# Patient Record
Sex: MALE | Race: WHITE | NOT HISPANIC OR LATINO | Employment: FULL TIME | URBAN - NONMETROPOLITAN AREA
[De-identification: names, ages, dates, MRNs, and addresses within clinical notes are randomized per-mention and may not be internally consistent; named-entity substitution may affect disease eponyms.]

---

## 2023-04-11 ENCOUNTER — TELEMEDICINE (OUTPATIENT)
Dept: PRIMARY CARE | Facility: CLINIC | Age: 63
End: 2023-04-11
Payer: COMMERCIAL

## 2023-04-11 DIAGNOSIS — M54.9 BACK PAIN, UNSPECIFIED BACK LOCATION, UNSPECIFIED BACK PAIN LATERALITY, UNSPECIFIED CHRONICITY: ICD-10-CM

## 2023-04-11 DIAGNOSIS — D64.9 ANEMIA, UNSPECIFIED TYPE: ICD-10-CM

## 2023-04-11 DIAGNOSIS — F41.9 ANXIETY: Chronic | ICD-10-CM

## 2023-04-11 DIAGNOSIS — I10 PRIMARY HYPERTENSION: Primary | ICD-10-CM

## 2023-04-11 PROCEDURE — 99214 OFFICE O/P EST MOD 30 MIN: CPT | Performed by: FAMILY MEDICINE

## 2023-04-11 RX ORDER — VALACYCLOVIR HYDROCHLORIDE 1 G/1
1000 TABLET, FILM COATED ORAL DAILY
COMMUNITY
Start: 2016-05-13 | End: 2023-09-11

## 2023-04-11 RX ORDER — LISINOPRIL 40 MG/1
40 TABLET ORAL DAILY
COMMUNITY
Start: 2020-01-07 | End: 2023-10-25

## 2023-04-11 RX ORDER — CHLORTHALIDONE 25 MG/1
25 TABLET ORAL DAILY
COMMUNITY
Start: 2022-05-16 | End: 2023-06-08

## 2023-04-11 RX ORDER — TADALAFIL 5 MG/1
5 TABLET ORAL DAILY PRN
COMMUNITY
Start: 2014-08-15 | End: 2023-09-22 | Stop reason: SDUPTHER

## 2023-04-11 RX ORDER — CARVEDILOL PHOSPHATE 40 MG/1
40 CAPSULE, EXTENDED RELEASE ORAL DAILY
COMMUNITY
Start: 2022-01-17 | End: 2023-06-08

## 2023-04-11 RX ORDER — GABAPENTIN 100 MG/1
1 CAPSULE ORAL 3 TIMES DAILY
COMMUNITY
Start: 2022-06-14 | End: 2023-09-22 | Stop reason: ALTCHOICE

## 2023-04-11 ASSESSMENT — PATIENT HEALTH QUESTIONNAIRE - PHQ9
1. LITTLE INTEREST OR PLEASURE IN DOING THINGS: NOT AT ALL
SUM OF ALL RESPONSES TO PHQ9 QUESTIONS 1 AND 2: 0
2. FEELING DOWN, DEPRESSED OR HOPELESS: NOT AT ALL

## 2023-04-11 ASSESSMENT — ANXIETY QUESTIONNAIRES
6. BECOMING EASILY ANNOYED OR IRRITABLE: NOT AT ALL
4. TROUBLE RELAXING: NOT AT ALL
5. BEING SO RESTLESS THAT IT IS HARD TO SIT STILL: NOT AT ALL
2. NOT BEING ABLE TO STOP OR CONTROL WORRYING: NOT AT ALL
3. WORRYING TOO MUCH ABOUT DIFFERENT THINGS: NOT AT ALL
7. FEELING AFRAID AS IF SOMETHING AWFUL MIGHT HAPPEN: NOT AT ALL
1. FEELING NERVOUS, ANXIOUS, OR ON EDGE: NOT AT ALL
GAD7 TOTAL SCORE: 0

## 2023-04-11 NOTE — PROGRESS NOTES
Subjective   Patient ID: Kashif Chapin is a 62 y.o. male who presents for virtual Follow-up (6 month follow up anxiety).    HPI     Patient reports that his moods are good.     Patient reports that he is sleeping well.     Patient states that he is walking daily.     Patient denies any chest pain, chest tightness, or chest pressure. States he checks his blood pressure at home daily.     Patient reports that his back pain has improved. States he takes Advil and Tylenol PRN.     Reviewed and discussed medication list. Patient denies any medication side effects.      Discussed and reviewed labwork done on 2/7/2023.    Review of Systems   All other systems reviewed and are negative.      Objective   There were no vitals taken for this visit.    Physical Exam  No exam done due to virtual visit.      Assessment/Plan     1. Primary hypertension    2. Anxiety    3. Back pain, unspecified back location, unspecified back pain laterality, unspecified chronicity    4. Anemia, unspecified type         I, Dr. Massimo Cowan, attest that this note for 4/11/2023 accurately reflects documentation that I made (or my scribe made at my direction) in my capacity as Dr. Massimo Cowan when I treated Kashif Chapin.

## 2023-04-16 PROBLEM — D64.9 ANEMIA: Status: ACTIVE | Noted: 2023-04-16

## 2023-04-16 PROBLEM — I10 PRIMARY HYPERTENSION: Status: ACTIVE | Noted: 2023-04-16

## 2023-05-04 DIAGNOSIS — F41.8 OTHER SPECIFIED ANXIETY DISORDERS: ICD-10-CM

## 2023-05-05 RX ORDER — ALPRAZOLAM 0.5 MG/1
TABLET ORAL
Qty: 60 TABLET | Refills: 0 | Status: SHIPPED | OUTPATIENT
Start: 2023-05-05 | End: 2023-06-12

## 2023-06-08 DIAGNOSIS — I10 PRIMARY HYPERTENSION: ICD-10-CM

## 2023-06-08 RX ORDER — CARVEDILOL PHOSPHATE 40 MG/1
CAPSULE, EXTENDED RELEASE ORAL
Qty: 90 CAPSULE | Refills: 3 | Status: SHIPPED | OUTPATIENT
Start: 2023-06-08

## 2023-06-08 RX ORDER — CHLORTHALIDONE 25 MG/1
TABLET ORAL
Qty: 90 TABLET | Refills: 3 | Status: SHIPPED | OUTPATIENT
Start: 2023-06-08 | End: 2023-09-22

## 2023-06-09 DIAGNOSIS — F41.8 OTHER SPECIFIED ANXIETY DISORDERS: ICD-10-CM

## 2023-06-12 RX ORDER — ALPRAZOLAM 0.5 MG/1
TABLET ORAL
Qty: 60 TABLET | Refills: 0 | Status: SHIPPED | OUTPATIENT
Start: 2023-06-12 | End: 2023-07-13

## 2023-06-13 ENCOUNTER — TELEPHONE (OUTPATIENT)
Dept: PRIMARY CARE | Facility: CLINIC | Age: 63
End: 2023-06-13
Payer: COMMERCIAL

## 2023-06-13 DIAGNOSIS — E87.1 HYPONATREMIA: Primary | ICD-10-CM

## 2023-06-13 NOTE — TELEPHONE ENCOUNTER
Call the patient and tell him that his recent labs show his Sodium level is dangerously low at 120,  normal should be around 135.  He must go to the Emergency Dept, to be evaluated and treated,Immediately.  This is very dangerous, it can cause him to have a seizure.  Please send the results of this blood test to the Emergency Dept that he is going to

## 2023-06-13 NOTE — TELEPHONE ENCOUNTER
I spoke with the patient and he is aware of the results, and he is aware that he needs to go to the ER,  He indicated he would go to the ER

## 2023-06-13 NOTE — TELEPHONE ENCOUNTER
Ro SHARMA from [facility] lab called with critical lab results. Pt's sodium level was 120. If you have any questions/concerns for Ro she can be reached at [phone].

## 2023-06-13 NOTE — TELEPHONE ENCOUNTER
Patient is aware, put him on hold to speak with Dr. Cowan directly since he was hesitant to go to emergency room at this moment.

## 2023-06-16 PROBLEM — B00.9 HERPES SIMPLEX: Status: ACTIVE | Noted: 2023-06-16

## 2023-06-16 PROBLEM — G47.00 INSOMNIA: Status: ACTIVE | Noted: 2023-06-16

## 2023-06-16 PROBLEM — B00.1 COLD SORE: Status: ACTIVE | Noted: 2023-06-16

## 2023-06-16 PROBLEM — N40.1 BPH ASSOCIATED WITH NOCTURIA: Status: ACTIVE | Noted: 2023-06-16

## 2023-06-16 PROBLEM — E29.1 TESTICULAR HYPOFUNCTION: Status: ACTIVE | Noted: 2023-06-16

## 2023-06-16 PROBLEM — N50.89 SWELLING OF SCROTUM PRESENT ON EXAMINATION: Status: ACTIVE | Noted: 2023-06-16

## 2023-06-16 PROBLEM — R35.0 URINARY FREQUENCY: Status: ACTIVE | Noted: 2023-06-16

## 2023-06-16 PROBLEM — L30.9 DERMATITIS: Status: ACTIVE | Noted: 2023-06-16

## 2023-06-16 PROBLEM — R35.1 BPH ASSOCIATED WITH NOCTURIA: Status: ACTIVE | Noted: 2023-06-16

## 2023-06-16 PROBLEM — J30.9 ALLERGIC RHINITIS: Status: ACTIVE | Noted: 2023-06-16

## 2023-06-16 PROBLEM — R73.01 ELEVATED FASTING GLUCOSE: Status: ACTIVE | Noted: 2023-06-16

## 2023-06-16 PROBLEM — M54.16 LUMBAR RADICULOPATHY: Status: ACTIVE | Noted: 2023-06-16

## 2023-06-16 PROBLEM — L01.00 IMPETIGO: Status: ACTIVE | Noted: 2023-06-16

## 2023-06-16 PROBLEM — E34.9 TESTOSTERONE DEFICIENCY: Status: ACTIVE | Noted: 2023-06-16

## 2023-06-16 PROBLEM — H61.22 IMPACTED CERUMEN OF LEFT EAR: Status: ACTIVE | Noted: 2023-06-16

## 2023-06-16 PROBLEM — E78.5 HYPERLIPIDEMIA: Status: ACTIVE | Noted: 2023-06-16

## 2023-06-16 PROBLEM — N50.82 PAIN IN SCROTUM WITHOUT TRAUMA OF SCROTUM: Status: ACTIVE | Noted: 2023-06-16

## 2023-06-16 PROBLEM — N50.819 PERSISTENT TESTICULAR PAIN: Status: ACTIVE | Noted: 2023-06-16

## 2023-06-19 ENCOUNTER — LAB (OUTPATIENT)
Dept: LAB | Facility: LAB | Age: 63
End: 2023-06-19
Payer: COMMERCIAL

## 2023-06-19 ENCOUNTER — OFFICE VISIT (OUTPATIENT)
Dept: PRIMARY CARE | Facility: CLINIC | Age: 63
End: 2023-06-19
Payer: COMMERCIAL

## 2023-06-19 ENCOUNTER — TELEPHONE (OUTPATIENT)
Dept: PRIMARY CARE | Facility: CLINIC | Age: 63
End: 2023-06-19

## 2023-06-19 VITALS
BODY MASS INDEX: 29.46 KG/M2 | DIASTOLIC BLOOD PRESSURE: 79 MMHG | HEART RATE: 76 BPM | WEIGHT: 202.4 LBS | SYSTOLIC BLOOD PRESSURE: 147 MMHG | OXYGEN SATURATION: 97 %

## 2023-06-19 DIAGNOSIS — F41.9 ANXIETY: Chronic | ICD-10-CM

## 2023-06-19 DIAGNOSIS — I10 PRIMARY HYPERTENSION: ICD-10-CM

## 2023-06-19 DIAGNOSIS — E87.1 HYPONATREMIA: ICD-10-CM

## 2023-06-19 DIAGNOSIS — Z79.899 MEDICATION MANAGEMENT: ICD-10-CM

## 2023-06-19 DIAGNOSIS — E87.1 HYPONATREMIA: Primary | ICD-10-CM

## 2023-06-19 DIAGNOSIS — M54.16 LUMBAR RADICULOPATHY: ICD-10-CM

## 2023-06-19 DIAGNOSIS — R35.1 BPH ASSOCIATED WITH NOCTURIA: ICD-10-CM

## 2023-06-19 DIAGNOSIS — N40.1 BPH ASSOCIATED WITH NOCTURIA: ICD-10-CM

## 2023-06-19 LAB
ANION GAP IN SER/PLAS: 13 MMOL/L (ref 10–20)
CALCIUM (MG/DL) IN SER/PLAS: 10.2 MG/DL (ref 8.6–10.6)
CARBON DIOXIDE, TOTAL (MMOL/L) IN SER/PLAS: 32 MMOL/L (ref 21–32)
CHLORIDE (MMOL/L) IN SER/PLAS: 87 MMOL/L (ref 98–107)
CREATININE (MG/DL) IN SER/PLAS: 0.95 MG/DL (ref 0.5–1.3)
GFR MALE: >90 ML/MIN/1.73M2
GLUCOSE (MG/DL) IN SER/PLAS: 118 MG/DL (ref 74–99)
POTASSIUM (MMOL/L) IN SER/PLAS: 3.5 MMOL/L (ref 3.5–5.3)
SODIUM (MMOL/L) IN SER/PLAS: 128 MMOL/L (ref 136–145)
UREA NITROGEN (MG/DL) IN SER/PLAS: 10 MG/DL (ref 6–23)

## 2023-06-19 PROCEDURE — 80048 BASIC METABOLIC PNL TOTAL CA: CPT

## 2023-06-19 PROCEDURE — 80365 DRUG SCREENING OXYCODONE: CPT

## 2023-06-19 PROCEDURE — 36415 COLL VENOUS BLD VENIPUNCTURE: CPT

## 2023-06-19 PROCEDURE — 3078F DIAST BP <80 MM HG: CPT | Performed by: FAMILY MEDICINE

## 2023-06-19 PROCEDURE — 80361 OPIATES 1 OR MORE: CPT

## 2023-06-19 PROCEDURE — 80368 SEDATIVE HYPNOTICS: CPT

## 2023-06-19 PROCEDURE — 80346 BENZODIAZEPINES1-12: CPT

## 2023-06-19 PROCEDURE — 80373 DRUG SCREENING TRAMADOL: CPT

## 2023-06-19 PROCEDURE — 99214 OFFICE O/P EST MOD 30 MIN: CPT | Performed by: FAMILY MEDICINE

## 2023-06-19 PROCEDURE — 80358 DRUG SCREENING METHADONE: CPT

## 2023-06-19 PROCEDURE — 80354 DRUG SCREENING FENTANYL: CPT

## 2023-06-19 PROCEDURE — 3077F SYST BP >= 140 MM HG: CPT | Performed by: FAMILY MEDICINE

## 2023-06-19 PROCEDURE — 80307 DRUG TEST PRSMV CHEM ANLYZR: CPT

## 2023-06-19 ASSESSMENT — ENCOUNTER SYMPTOMS
GASTROINTESTINAL NEGATIVE: 1
ENDOCRINE NEGATIVE: 1
NEUROLOGICAL NEGATIVE: 1
CARDIOVASCULAR NEGATIVE: 1
CONSTITUTIONAL NEGATIVE: 1
MUSCULOSKELETAL NEGATIVE: 1
ALLERGIC/IMMUNOLOGIC NEGATIVE: 1
PSYCHIATRIC NEGATIVE: 1
RESPIRATORY NEGATIVE: 1
HEMATOLOGIC/LYMPHATIC NEGATIVE: 1
EYES NEGATIVE: 1

## 2023-06-19 NOTE — PATIENT INSTRUCTIONS
1.  Hyponatremia    Last week your sodium was 120.  You appropriately went to the emergency department in [place].  Unfortunately we do not have the labs that were obtained at that time.  We will place request for those labs we will contact you with those results.    Please repeat labs today we will contact you with those results    Continue on all your current medications as well as your supplements.  Depending on the results of the sodium we may need to discontinue the chlorthalidone but I am concerned your blood pressure will go up therefore we will have to switch to a different medication    2.  Hypertension    Continue on your [place] Radha all your lisinopril and chlorthalidone for now by lowering blood pressure we do reduce risk for heart attack and stroke    3.  Continue on the gabapentin as needed for your lumbar radiculopathy and leg pain    4.  Continue on low-dose Xanax as needed for periods of heightened anxiety.  Because of his medicines a controlled substance we will do a urine test today and update your controlled substance agreement    I will call you with results of labs you will call me if anything changes we can do a virtual visit in 3 months but am happy to see you sooner if needed

## 2023-06-19 NOTE — PROGRESS NOTES
Subjective   Patient ID: Kashif Chapin is a 63 y.o. male who presents for Hypertension, Anxiety (Due for UDS (last 1/17/2022) /CSA signed in Epic today /Scales given ), and Hospital Follow-up (Went to ER d/t low sodium, was given IV fluids. Signed out AMA //Would like to know what they put into his IV at the hospital and desiree it as a drug allergy because it caused his bp to spike really high ).    Patient notes he waited for 5 hours in the ER before refusing medical care due to needing medication in a timely manner.  He would like to obtain the lab results done at the hospital since he was not informed of the results. He then returned home and consumed electrolyte fluids. Patient has stopped salting his food and switched to an all meat diet. He walks a mile daily. Patient is compliant with medication and reports benefits. He denies side effects. Patient denies chest pain, pressure, and tightness upon exertion. He notes when his sodium is low he feels tired and dehydrated.    Review of Systems   Constitutional: Negative.    HENT: Negative.     Eyes: Negative.    Respiratory: Negative.     Cardiovascular: Negative.    Gastrointestinal: Negative.    Endocrine: Negative.    Genitourinary: Negative.    Musculoskeletal: Negative.    Skin: Negative.    Allergic/Immunologic: Negative.    Neurological: Negative.    Hematological: Negative.    Psychiatric/Behavioral: Negative.         Objective   /79   Pulse 76   Wt 91.8 kg (202 lb 6.4 oz)   SpO2 97%   BMI 29.46 kg/m²     Physical Exam  Constitutional:       Appearance: Normal appearance.   HENT:      Head: Normocephalic and atraumatic.      Right Ear: Tympanic membrane normal.      Left Ear: Tympanic membrane normal.      Nose: Nose normal.      Mouth/Throat:      Mouth: Mucous membranes are moist.      Pharynx: Oropharynx is clear.   Eyes:      Extraocular Movements: Extraocular movements intact.      Conjunctiva/sclera: Conjunctivae normal.      Pupils: Pupils  are equal, round, and reactive to light.   Cardiovascular:      Rate and Rhythm: Normal rate and regular rhythm.      Pulses: Normal pulses.      Heart sounds: Normal heart sounds.   Pulmonary:      Effort: Pulmonary effort is normal.      Breath sounds: Normal breath sounds.   Abdominal:      General: Bowel sounds are normal.      Palpations: Abdomen is soft.   Musculoskeletal:         General: Normal range of motion.      Cervical back: Normal range of motion and neck supple.   Skin:     General: Skin is warm.   Neurological:      Mental Status: He is alert and oriented to person, place, and time.   Psychiatric:         Mood and Affect: Mood normal.         Behavior: Behavior normal.         Assessment/Plan   Diagnoses and all orders for this visit:  Hyponatremia  -     Basic metabolic panel; Future  Medication management  -     Opiate/Opioid/Benzo Extended Prescription Compliance; Future  Anxiety  Comments:  Continue on current doses of low-dose Xanax.  .  Try to be outside every day.  Continue with daily meditation.  Call if worsens  Orders:  -     Follow Up In [facility] - PCP  Primary hypertension  Comments:  Continue to check blood pressure at home goal for systolic less than 140 and continue on all current medications as prescribed for blood pressure  Orders:  -     Follow Up In [facility] - PCP    1.  Hyponatremia    Last week your sodium was 120.  You appropriately went to the emergency department in [place].  Unfortunately we do not have the labs that were obtained at that time.  We will place request for those labs we will contact you with those results.    Please repeat labs today we will contact you with those results    Continue on all your current medications as well as your supplements.  Depending on the results of the sodium we may need to discontinue the chlorthalidone but I am concerned your blood pressure will go up therefore we will have to switch to a different  medication    2.  Hypertension    Continue on your [place] Radha all your lisinopril and chlorthalidone for now by lowering blood pressure we do reduce risk for heart attack and stroke    3.  Continue on the gabapentin as needed for your lumbar radiculopathy and leg pain    4.  Continue on low-dose Xanax as needed for periods of heightened anxiety.  Because of his medicines a controlled substance we will do a urine test today and update your controlled substance agreement    I will call you with results of labs you will call me if anything changes we can do a virtual visit in 3 months but am happy to see you sooner if needed      Scribe Attestation  By signing my name below, IDeepika , Scribe   attest that this documentation has been prepared under the direction and in the presence of Massimo Cowan MD.

## 2023-06-19 NOTE — TELEPHONE ENCOUNTER
Records request for patient's ER visit at [facility] in [place]   I called and left a message for the records, asked to cb or have them faxed

## 2023-06-20 ENCOUNTER — TELEPHONE (OUTPATIENT)
Dept: PRIMARY CARE | Facility: CLINIC | Age: 63
End: 2023-06-20

## 2023-06-20 NOTE — TELEPHONE ENCOUNTER
Patient called and states that insurance presented yesterday does date back to April 2023  Dee CHRISTIAN notified.

## 2023-06-20 NOTE — TELEPHONE ENCOUNTER
Left message for patient to return call back.  The insurance we have for his appt. 4/11 is inactive.  There are 3 [place] plans in his chart and they are all inactive.  Does he have a new insurance?  The is a [employer] card scanned, is that his new insurance?

## 2023-06-22 LAB
6-ACETYLMORPHINE: <25 NG/ML
7-AMINOCLONAZEPAM: <25 NG/ML
ALPHA-HYDROXYALPRAZOLAM: <25 NG/ML
ALPHA-HYDROXYMIDAZOLAM: <25 NG/ML
ALPRAZOLAM: 37 NG/ML
AMPHETAMINE (PRESENCE) IN URINE BY SCREEN METHOD: ABNORMAL
BARBITURATES PRESENCE IN URINE BY SCREEN METHOD: ABNORMAL
CANNABINOIDS IN URINE BY SCREEN METHOD: ABNORMAL
CHLORDIAZEPOXIDE: <25 NG/ML
CLONAZEPAM: <25 NG/ML
COCAINE (PRESENCE) IN URINE BY SCREEN METHOD: ABNORMAL
CODEINE: <50 NG/ML
CREATINE, URINE FOR DRUG: 20.3 MG/DL
DIAZEPAM: <25 NG/ML
DRUG SCREEN COMMENT URINE: ABNORMAL
EDDP: <25 NG/ML
FENTANYL CONFIRMATION, URINE: <2.5 NG/ML
HYDROCODONE: <25 NG/ML
HYDROMORPHONE: <25 NG/ML
LORAZEPAM: <25 NG/ML
METHADONE CONFIRMATION,URINE: <25 NG/ML
MIDAZOLAM: <25 NG/ML
MORPHINE URINE: <50 NG/ML
NORDIAZEPAM: <25 NG/ML
NORFENTANYL: <2.5 NG/ML
NORHYDROCODONE: <25 NG/ML
NOROXYCODONE: <25 NG/ML
O-DESMETHYLTRAMADOL: <50 NG/ML
OXAZEPAM: <25 NG/ML
OXYCODONE: <25 NG/ML
OXYMORPHONE: <25 NG/ML
PHENCYCLIDINE (PRESENCE) IN URINE BY SCREEN METHOD: ABNORMAL
TEMAZEPAM: <25 NG/ML
TRAMADOL: <50 NG/ML
ZOLPIDEM METABOLITE (ZCA): <25 NG/ML
ZOLPIDEM: <25 NG/ML

## 2023-06-23 PROBLEM — E87.1 HYPONATREMIA: Status: ACTIVE | Noted: 2023-06-23

## 2023-07-13 DIAGNOSIS — F41.8 OTHER SPECIFIED ANXIETY DISORDERS: ICD-10-CM

## 2023-07-13 RX ORDER — ALPRAZOLAM 0.5 MG/1
TABLET ORAL
Qty: 60 TABLET | Refills: 0 | Status: SHIPPED | OUTPATIENT
Start: 2023-07-13 | End: 2023-08-17

## 2023-07-21 ENCOUNTER — APPOINTMENT (OUTPATIENT)
Dept: PRIMARY CARE | Facility: CLINIC | Age: 63
End: 2023-07-21
Payer: COMMERCIAL

## 2023-08-17 DIAGNOSIS — F41.8 OTHER SPECIFIED ANXIETY DISORDERS: ICD-10-CM

## 2023-08-17 RX ORDER — ALPRAZOLAM 0.5 MG/1
TABLET ORAL
Qty: 60 TABLET | Refills: 0 | Status: SHIPPED | OUTPATIENT
Start: 2023-08-17 | End: 2023-09-19

## 2023-09-11 DIAGNOSIS — B00.9 HERPES SIMPLEX: Primary | ICD-10-CM

## 2023-09-11 RX ORDER — VALACYCLOVIR HYDROCHLORIDE 1 G/1
1000 TABLET, FILM COATED ORAL DAILY
Qty: 30 TABLET | Refills: 11 | Status: SHIPPED | OUTPATIENT
Start: 2023-09-11

## 2023-09-17 NOTE — PROGRESS NOTES
Subjective   Kashif Chapin is a 63 y.o. male who presents for Follow-up (Meds, did blood test today, interested in siadh and would like to discuss).    The patient is compliant with medications. Patient denies any side effects to the medications. The patient Is clinically stable so we will continue with current medications and lab work to confirm status ordered.       HPI    Anxiety:  The patient is presenting today for a follow up of anxiety disorder. He denies having any current suicidal and/or homicidal ideation.  pt reports that he is taking one xanax a day.    Low sodium: someone in family suggested that he be tested for SIADH. Pt is still intaking a decent amount of sodium. The patient is continuously working on diet and exercise. The patient will continue working on strategies to maintain weight loss and stay well hydrated.       Review of Systems   Constitutional:  Negative for chills, diaphoresis and fever.   HENT:  Negative for congestion, ear pain, hearing loss, sinus pressure and sinus pain.    Eyes:  Negative for pain and visual disturbance.   Respiratory:  Negative for chest tightness and shortness of breath.    Cardiovascular:  Negative for chest pain and leg swelling.   Gastrointestinal:  Negative for abdominal pain, blood in stool, constipation, diarrhea, nausea and vomiting.   Genitourinary:  Negative for dysuria, frequency and urgency.   Musculoskeletal:  Negative for arthralgias, myalgias and neck pain.   Neurological:  Negative for headaches.     OARRS:  Massimo Cowan MD on 9/22/2023  3:23 PM  I have personally reviewed the OARRS report for Kasihf Chapin. I have considered the risks of abuse, dependence, addiction and diversion    Is the patient prescribed a combination of a benzodiazepine and opioid?  Yes, I feel it is clincially indicated to continue the medication and have discussed with the patient risks/benefits/alternatives.    Last Urine Drug Screen / ordered today: No  Recent Results  (from the past 8760 hour(s))   OPIATE/OPIOID/BENZO PRESCRIPTION COMPLIANCE    Collection Time: 06/19/23 12:24 PM   Result Value Ref Range    DRUG SCREEN COMMENT URINE SEE BELOW     Creatine, Urine 20.3 mg/dL    Amphetamine Screen, Urine PRESUMPTIVE NEGATIVE NEGATIVE    Barbiturate Screen, Urine PRESUMPTIVE NEGATIVE NEGATIVE    Cannabinoid Screen, Urine PRESUMPTIVE NEGATIVE NEGATIVE    Cocaine Screen, Urine PRESUMPTIVE NEGATIVE NEGATIVE    PCP Screen, Urine PRESUMPTIVE NEGATIVE NEGATIVE    7-Aminoclonazepam <25 Cutoff <25 ng/mL    Alpha-Hydroxyalprazolam <25 Cutoff <25 ng/mL    Alpha-Hydroxymidazolam <25 Cutoff <25 ng/mL    Alprazolam 37 (A) Cutoff <25 ng/mL    Chlordiazepoxide <25 Cutoff <25 ng/mL    Clonazepam <25 Cutoff <25 ng/mL    Diazepam <25 Cutoff <25 ng/mL    Lorazepam <25 Cutoff <25 ng/mL    Midazolam <25 Cutoff <25 ng/mL    Nordiazepam <25 Cutoff <25 ng/mL    Oxazepam <25 Cutoff <25 ng/mL    Temazepam <25 Cutoff <25 ng/mL    Zolpidem <25 Cutoff <25 ng/mL    Zolpidem Metabolite (ZCA) <25 Cutoff <25 ng/mL    6-Acetylmorphine <25 Cutoff <25 ng/mL    Codeine <50 Cutoff <50 ng/mL    Hydrocodone <25 Cutoff <25 ng/mL    Hydromorphone <25 Cutoff <25 ng/mL    Morphine Urine <50 Cutoff <50 ng/mL    Norhydrocodone <25 Cutoff <25 ng/mL    Noroxycodone <25 Cutoff <25 ng/mL    Oxycodone <25 Cutoff <25 ng/mL    Oxymorphone <25 Cutoff <25 ng/mL    Tramadol <50 Cutoff <50 ng/mL    O-Desmethyltramadol <50 Cutoff <50 ng/mL    Fentanyl <2.5 Cutoff<2.5 ng/mL    Norfentanyl <2.5 Cutoff<2.5 ng/mL    METHADONE CONFIRMATION,URINE <25 Cutoff <25 ng/mL    EDDP <25 Cutoff <25 ng/mL     Results are as expected.         Controlled Substance Agreement:  Date of the Last Agreement: UDS& CDS 6/19/2023  Reviewed Controlled Substance Agreement including but not limited to the benefits, risks, and alternatives to treatment with a Controlled Substance medication(s).      Objective   This is a virtual visit with real-time audio and video  communication with the patient. The patient is in no acute distress.    Labs active- future.       Assessment/Plan   Problem List Items Addressed This Visit       Anxiety    Primary hypertension    Nocturia associated with benign prostatic hyperplasia - Primary    Relevant Medications    tadalafil (Cialis) 5 mg tablet    Hyperlipidemia    Hyponatremia     A telephone visit (audio and video) between the patient (at the originating site) and the provider (at the distant site) was utilized to provide this telehealth service.   Verbal consent was requested and obtained from Kashif Chapin on this date, 09/22/23    , for a telehealth visit.      Follow up in: 3 month(s) or sooner if needed with labs prior.      Scribe Attestation  By signing my name below, I, Ila Michael   attest that this documentation has been prepared under the direction and in the presence of Massimo Cowan MD.

## 2023-09-19 DIAGNOSIS — F41.8 OTHER SPECIFIED ANXIETY DISORDERS: ICD-10-CM

## 2023-09-19 RX ORDER — ALPRAZOLAM 0.5 MG/1
0.5 TABLET ORAL 2 TIMES DAILY PRN
Qty: 60 TABLET | Refills: 0 | Status: SHIPPED | OUTPATIENT
Start: 2023-09-19 | End: 2023-10-19

## 2023-09-22 ENCOUNTER — TELEMEDICINE (OUTPATIENT)
Dept: PRIMARY CARE | Facility: CLINIC | Age: 63
End: 2023-09-22
Payer: COMMERCIAL

## 2023-09-22 DIAGNOSIS — I10 PRIMARY HYPERTENSION: ICD-10-CM

## 2023-09-22 DIAGNOSIS — F41.9 ANXIETY: Primary | Chronic | ICD-10-CM

## 2023-09-22 DIAGNOSIS — E87.1 HYPONATREMIA: ICD-10-CM

## 2023-09-22 DIAGNOSIS — R35.1 NOCTURIA ASSOCIATED WITH BENIGN PROSTATIC HYPERPLASIA: ICD-10-CM

## 2023-09-22 DIAGNOSIS — N40.1 NOCTURIA ASSOCIATED WITH BENIGN PROSTATIC HYPERPLASIA: ICD-10-CM

## 2023-09-22 DIAGNOSIS — E78.2 MIXED HYPERLIPIDEMIA: ICD-10-CM

## 2023-09-22 PROBLEM — M54.50 LOW BACK PAIN: Status: ACTIVE | Noted: 2023-04-11

## 2023-09-22 PROCEDURE — 99214 OFFICE O/P EST MOD 30 MIN: CPT | Performed by: FAMILY MEDICINE

## 2023-09-22 RX ORDER — TADALAFIL 5 MG/1
5 TABLET ORAL DAILY
Qty: 90 TABLET | Refills: 3 | Status: SHIPPED | OUTPATIENT
Start: 2023-09-22 | End: 2024-02-06 | Stop reason: SDUPTHER

## 2023-09-22 ASSESSMENT — ANXIETY QUESTIONNAIRES
2. NOT BEING ABLE TO STOP OR CONTROL WORRYING: NOT AT ALL
3. WORRYING TOO MUCH ABOUT DIFFERENT THINGS: NOT AT ALL
1. FEELING NERVOUS, ANXIOUS, OR ON EDGE: NOT AT ALL
6. BECOMING EASILY ANNOYED OR IRRITABLE: NOT AT ALL
7. FEELING AFRAID AS IF SOMETHING AWFUL MIGHT HAPPEN: NOT AT ALL
4. TROUBLE RELAXING: NOT AT ALL
5. BEING SO RESTLESS THAT IT IS HARD TO SIT STILL: NOT AT ALL
GAD7 TOTAL SCORE: 0

## 2023-09-22 ASSESSMENT — ENCOUNTER SYMPTOMS
SINUS PAIN: 0
DYSURIA: 0
SINUS PRESSURE: 0
VOMITING: 0
HEADACHES: 0
DIARRHEA: 0
CHILLS: 0
DIAPHORESIS: 0
BLOOD IN STOOL: 0
MYALGIAS: 0
ARTHRALGIAS: 0
FEVER: 0
CONSTIPATION: 0
FREQUENCY: 0
EYE PAIN: 0
CHEST TIGHTNESS: 0
ABDOMINAL PAIN: 0
SHORTNESS OF BREATH: 0
NAUSEA: 0
NECK PAIN: 0

## 2023-09-22 ASSESSMENT — PATIENT HEALTH QUESTIONNAIRE - PHQ9
SUM OF ALL RESPONSES TO PHQ QUESTIONS 1-9: 0
6. FEELING BAD ABOUT YOURSELF - OR THAT YOU ARE A FAILURE OR HAVE LET YOURSELF OR YOUR FAMILY DOWN: NOT AT ALL
3. TROUBLE FALLING OR STAYING ASLEEP OR SLEEPING TOO MUCH: NOT AT ALL
4. FEELING TIRED OR HAVING LITTLE ENERGY: NOT AT ALL
2. FEELING DOWN, DEPRESSED OR HOPELESS: NOT AT ALL
7. TROUBLE CONCENTRATING ON THINGS, SUCH AS READING THE NEWSPAPER OR WATCHING TELEVISION: NOT AT ALL
1. LITTLE INTEREST OR PLEASURE IN DOING THINGS: NOT AT ALL
8. MOVING OR SPEAKING SO SLOWLY THAT OTHER PEOPLE COULD HAVE NOTICED. OR THE OPPOSITE, BEING SO FIGETY OR RESTLESS THAT YOU HAVE BEEN MOVING AROUND A LOT MORE THAN USUAL: NOT AT ALL
9. THOUGHTS THAT YOU WOULD BE BETTER OFF DEAD, OR OF HURTING YOURSELF: NOT AT ALL
5. POOR APPETITE OR OVEREATING: NOT AT ALL
SUM OF ALL RESPONSES TO PHQ9 QUESTIONS 1 AND 2: 0

## 2023-10-03 ENCOUNTER — TELEPHONE (OUTPATIENT)
Dept: PRIMARY CARE | Facility: CLINIC | Age: 63
End: 2023-10-03

## 2023-10-03 DIAGNOSIS — E87.1 HYPONATREMIA: Primary | ICD-10-CM

## 2023-10-03 NOTE — TELEPHONE ENCOUNTER
The patient is calling in this morning with questions about his sodium levels.  On 9/22 there was an order in our system for a BMP. The patient went to an outside lab, what is scanned into our system shows they sarah a CBC with Diff.  The patient states this does not show his sodium, and I explained that a CBC with Diff would not show a sodium level because that specific lab does not test for Sodium.  The patient is confused because he states that he and the provider had spoke about drinking electrolytes to bring up his levels and wanting it tested.  He is asking if there was an error on the labs part or what happened?  Does he need to have a new order placed to have this tested?

## 2023-10-03 NOTE — TELEPHONE ENCOUNTER
Call and tell him that the Order was placed for a Basic Metabolic Panel, but it appears that the lab ran a CBC?  Not sure why.  I can place a new order for a Basic metabolic panel.  If the basic metabolic panel continues to show a low sodium then I will place the orders for the Urine Sodium and Urine Osm. And the Serum Osm and the Serum Sodium and he will need to have this done to help determine why his sodium continues to be low.    I printed the order for his repeat Basic Metabolic Panel,  we may need to fax it to him or the hospital

## 2023-10-16 ENCOUNTER — TELEPHONE (OUTPATIENT)
Dept: PRIMARY CARE | Facility: CLINIC | Age: 63
End: 2023-10-16
Payer: COMMERCIAL

## 2023-10-16 DIAGNOSIS — M54.16 LUMBAR RADICULOPATHY: Primary | ICD-10-CM

## 2023-10-16 DIAGNOSIS — E87.1 HYPONATREMIA: ICD-10-CM

## 2023-10-16 NOTE — TELEPHONE ENCOUNTER
Pt calling asking if you can please place a referral for him to continue physical therapy for his back? [facility]    [phone] fax [phone]

## 2023-10-19 DIAGNOSIS — F41.8 OTHER SPECIFIED ANXIETY DISORDERS: ICD-10-CM

## 2023-10-19 RX ORDER — ALPRAZOLAM 0.5 MG/1
0.5 TABLET ORAL 2 TIMES DAILY PRN
Qty: 60 TABLET | Refills: 0 | Status: SHIPPED | OUTPATIENT
Start: 2023-10-19 | End: 2023-11-20 | Stop reason: SDUPTHER

## 2023-10-20 ENCOUNTER — TELEPHONE (OUTPATIENT)
Dept: PRIMARY CARE | Facility: CLINIC | Age: 63
End: 2023-10-20
Payer: COMMERCIAL

## 2023-10-20 DIAGNOSIS — M25.519 SHOULDER PAIN, UNSPECIFIED CHRONICITY, UNSPECIFIED LATERALITY: ICD-10-CM

## 2023-10-20 DIAGNOSIS — M54.50 LOW BACK PAIN, UNSPECIFIED BACK PAIN LATERALITY, UNSPECIFIED CHRONICITY, UNSPECIFIED WHETHER SCIATICA PRESENT: Primary | ICD-10-CM

## 2023-10-20 NOTE — TELEPHONE ENCOUNTER
I printed the order for his Physical Therapy.  I believe this was already done, please fax to the therapist of his choice

## 2023-10-20 NOTE — TELEPHONE ENCOUNTER
----- Message from Valeria Cintron CMA sent at 10/19/2023 12:54 PM EDT -----  Regarding: FW: Physical therapy   Contact: [phone]    ----- Message -----  From: Kashif Chapin  Sent: 10/19/2023  12:51 PM EDT  To: Do Mccabe[address] [place] [address] Clinical Support Staff  Subject: Physical therapy                                 Hey Dr Cowan  Please send a referral to [facility] here in [facility] [place]  They have helped a lot with back and shoulder soreness from working out   Fax: [phone]  Phone: [phone]  Dr Martinez is who I see for dry needling  Please advise

## 2023-10-25 DIAGNOSIS — E78.5 HYPERLIPIDEMIA, UNSPECIFIED HYPERLIPIDEMIA TYPE: Primary | ICD-10-CM

## 2023-10-25 RX ORDER — LISINOPRIL 40 MG/1
40 TABLET ORAL DAILY
Qty: 90 TABLET | Refills: 0 | Status: SHIPPED | OUTPATIENT
Start: 2023-10-25 | End: 2024-01-19

## 2023-10-30 ENCOUNTER — TELEPHONE (OUTPATIENT)
Dept: PRIMARY CARE | Facility: CLINIC | Age: 63
End: 2023-10-30

## 2023-10-30 NOTE — TELEPHONE ENCOUNTER
Pt called back said the referral placed was only for the back and also needed for his Right shoulder.

## 2023-11-20 DIAGNOSIS — F41.8 OTHER SPECIFIED ANXIETY DISORDERS: ICD-10-CM

## 2023-11-20 RX ORDER — ALPRAZOLAM 0.5 MG/1
0.5 TABLET ORAL 2 TIMES DAILY PRN
Qty: 60 TABLET | Refills: 0 | Status: SHIPPED | OUTPATIENT
Start: 2023-11-20 | End: 2023-12-29

## 2023-12-18 ENCOUNTER — TELEPHONE (OUTPATIENT)
Dept: PRIMARY CARE | Facility: CLINIC | Age: 63
End: 2023-12-18
Payer: COMMERCIAL

## 2023-12-18 NOTE — TELEPHONE ENCOUNTER
Patient states he has a sinus infection    He is requesting a z pack be called in for him like you have in the past    He is currently out of town on vacation       [facility]

## 2023-12-21 DIAGNOSIS — U07.1 COVID-19: ICD-10-CM

## 2023-12-22 RX ORDER — BENZONATATE 200 MG/1
200 CAPSULE ORAL 3 TIMES DAILY PRN
Qty: 30 CAPSULE | Refills: 0 | Status: SHIPPED | OUTPATIENT
Start: 2023-12-22 | End: 2024-02-06 | Stop reason: WASHOUT

## 2023-12-27 DIAGNOSIS — F41.8 OTHER SPECIFIED ANXIETY DISORDERS: ICD-10-CM

## 2023-12-29 RX ORDER — ALPRAZOLAM 0.5 MG/1
0.5 TABLET ORAL 2 TIMES DAILY PRN
Qty: 60 TABLET | Refills: 0 | Status: SHIPPED | OUTPATIENT
Start: 2023-12-29 | End: 2024-01-31

## 2024-01-19 DIAGNOSIS — E78.5 HYPERLIPIDEMIA, UNSPECIFIED HYPERLIPIDEMIA TYPE: ICD-10-CM

## 2024-01-19 RX ORDER — LISINOPRIL 40 MG/1
40 TABLET ORAL DAILY
Qty: 90 TABLET | Refills: 0 | Status: SHIPPED | OUTPATIENT
Start: 2024-01-19 | End: 2024-04-17

## 2024-01-30 DIAGNOSIS — F41.8 OTHER SPECIFIED ANXIETY DISORDERS: ICD-10-CM

## 2024-01-31 RX ORDER — ALPRAZOLAM 0.5 MG/1
0.5 TABLET ORAL 2 TIMES DAILY PRN
Qty: 60 TABLET | Refills: 0 | Status: SHIPPED | OUTPATIENT
Start: 2024-01-31 | End: 2024-03-07

## 2024-02-06 ENCOUNTER — TELEMEDICINE (OUTPATIENT)
Dept: PRIMARY CARE | Facility: CLINIC | Age: 64
End: 2024-02-06
Payer: COMMERCIAL

## 2024-02-06 DIAGNOSIS — E78.2 MIXED HYPERLIPIDEMIA: Primary | ICD-10-CM

## 2024-02-06 DIAGNOSIS — F41.9 ANXIETY: Chronic | ICD-10-CM

## 2024-02-06 DIAGNOSIS — E34.9 TESTOSTERONE DEFICIENCY: ICD-10-CM

## 2024-02-06 DIAGNOSIS — E55.9 VITAMIN D DEFICIENCY: ICD-10-CM

## 2024-02-06 DIAGNOSIS — N40.1 NOCTURIA ASSOCIATED WITH BENIGN PROSTATIC HYPERPLASIA: ICD-10-CM

## 2024-02-06 DIAGNOSIS — R35.1 NOCTURIA ASSOCIATED WITH BENIGN PROSTATIC HYPERPLASIA: ICD-10-CM

## 2024-02-06 DIAGNOSIS — Z12.5 PROSTATE CANCER SCREENING: ICD-10-CM

## 2024-02-06 DIAGNOSIS — I10 PRIMARY HYPERTENSION: ICD-10-CM

## 2024-02-06 PROCEDURE — 99213 OFFICE O/P EST LOW 20 MIN: CPT | Performed by: FAMILY MEDICINE

## 2024-02-06 RX ORDER — TADALAFIL 5 MG/1
5 TABLET ORAL DAILY
Qty: 90 TABLET | Refills: 3 | Status: SHIPPED | OUTPATIENT
Start: 2024-02-06 | End: 2025-02-05

## 2024-02-06 ASSESSMENT — PATIENT HEALTH QUESTIONNAIRE - PHQ9
SUM OF ALL RESPONSES TO PHQ9 QUESTIONS 1 AND 2: 0
1. LITTLE INTEREST OR PLEASURE IN DOING THINGS: NOT AT ALL
2. FEELING DOWN, DEPRESSED OR HOPELESS: NOT AT ALL
10. IF YOU CHECKED OFF ANY PROBLEMS, HOW DIFFICULT HAVE THESE PROBLEMS MADE IT FOR YOU TO DO YOUR WORK, TAKE CARE OF THINGS AT HOME, OR GET ALONG WITH OTHER PEOPLE: NOT DIFFICULT AT ALL

## 2024-02-06 NOTE — PROGRESS NOTES
Subjective   Patient ID: Kashif Chapin is a 63 y.o. male who presents for Follow-up.    HPI     OARRS:  Massimo Cowan MD on 2/6/2024  3:14 PM  I have personally reviewed the OARRS report for Kashif Chapin. I have considered the risks of abuse, dependence, addiction and diversion and I believe that it is clinically appropriate for Kashif Chapin to be prescribed this medication    Is the patient prescribed a combination of a benzodiazepine and opioid?  No    Last Urine Drug Screen / ordered today: No  Recent Results (from the past 8760 hour(s))   OPIATE/OPIOID/BENZO PRESCRIPTION COMPLIANCE    Collection Time: 06/19/23 12:24 PM   Result Value Ref Range    DRUG SCREEN COMMENT URINE SEE BELOW     Creatine, Urine 20.3 mg/dL    Amphetamine Screen, Urine PRESUMPTIVE NEGATIVE NEGATIVE    Barbiturate Screen, Urine PRESUMPTIVE NEGATIVE NEGATIVE    Cannabinoid Screen, Urine PRESUMPTIVE NEGATIVE NEGATIVE    Cocaine Screen, Urine PRESUMPTIVE NEGATIVE NEGATIVE    PCP Screen, Urine PRESUMPTIVE NEGATIVE NEGATIVE    7-Aminoclonazepam <25 Cutoff <25 ng/mL    Alpha-Hydroxyalprazolam <25 Cutoff <25 ng/mL    Alpha-Hydroxymidazolam <25 Cutoff <25 ng/mL    Alprazolam 37 (A) Cutoff <25 ng/mL    Chlordiazepoxide <25 Cutoff <25 ng/mL    Clonazepam <25 Cutoff <25 ng/mL    Diazepam <25 Cutoff <25 ng/mL    Lorazepam <25 Cutoff <25 ng/mL    Midazolam <25 Cutoff <25 ng/mL    Nordiazepam <25 Cutoff <25 ng/mL    Oxazepam <25 Cutoff <25 ng/mL    Temazepam <25 Cutoff <25 ng/mL    Zolpidem <25 Cutoff <25 ng/mL    Zolpidem Metabolite (ZCA) <25 Cutoff <25 ng/mL    6-Acetylmorphine <25 Cutoff <25 ng/mL    Codeine <50 Cutoff <50 ng/mL    Hydrocodone <25 Cutoff <25 ng/mL    Hydromorphone <25 Cutoff <25 ng/mL    Morphine Urine <50 Cutoff <50 ng/mL    Norhydrocodone <25 Cutoff <25 ng/mL    Noroxycodone <25 Cutoff <25 ng/mL    Oxycodone <25 Cutoff <25 ng/mL    Oxymorphone <25 Cutoff <25 ng/mL    Tramadol <50 Cutoff <50 ng/mL    O-Desmethyltramadol <50  Cutoff <50 ng/mL    Fentanyl <2.5 Cutoff<2.5 ng/mL    Norfentanyl <2.5 Cutoff<2.5 ng/mL    METHADONE CONFIRMATION,URINE <25 Cutoff <25 ng/mL    EDDP <25 Cutoff <25 ng/mL     Results are as expected.         Controlled Substance Agreement:  Date of the Last Agreement: 6/19/2023  Reviewed Controlled Substance Agreement including but not limited to the benefits, risks, and alternatives to treatment with a Controlled Substance medication(s).    Benzodiazepines:  What is the patient's goal of therapy? Xanax is to help with anxiety and helping to manage anxiety symptoms that may arise. This is to improve quality of life.   Is this being achieved with current treatment? The patient medication is helping with control symptoms of anxiety. He has cut back on medication and is able to continue with daily life. No side effects.    ELENA-7:  No data recorded    Activities of Daily Living:   Is your overall impression that this patient is benefiting (symptom reduction outweighs side effects) from benzodiazepine therapy? Yes     1. Physical Functioning: Better  2. Family Relationship: Better  3. Social Relationship: Better  4. Mood: Better  5. Sleep Patterns: Better  6. Overall Function: Better      The patient has been starting to work out again. He is walking for exercise and is planning on joining a gym. The patient is eating a healthy diet and staying hydrated.    The patient denies any changes in vision, hearing or dental.     The patient maintains they do not have any chest pain, chest tightness or shortness of breath.    They do not experience nausea, emesis, changes in bowel movements or dyspepsia.    The patient reports nocturia. They report urinating 2 times a night. The nocturia does not cause sleep disturbances.     The patient's vaccinations are up to date.    The patient does use tobacco once in a while, a cigar.    Review of Systems    Objective   There were no vitals taken for this visit.    Physical  Exam  Neurological:      Mental Status: He is oriented to person, place, and time.   Psychiatric:         Mood and Affect: Mood normal.         Behavior: Behavior normal.         Thought Content: Thought content normal.         Judgment: Judgment normal.         Assessment/Plan   Problem List Items Addressed This Visit             ICD-10-CM    Anxiety F41.9    Relevant Orders    Follow Up In [facility] - PCP - Health Maintenance    Primary hypertension I10    Nocturia associated with benign prostatic hyperplasia N40.1, R35.1    Relevant Medications    tadalafil (Cialis) 5 mg tablet    Hyperlipidemia - Primary E78.5    Relevant Orders    CBC and Auto Differential    Comprehensive metabolic panel    Lipid panel    Tsh With Reflex To Free T4 If Abnormal    Hemoglobin A1c    Testosterone deficiency E34.9    Relevant Orders    Testosterone, total and free    Follow Up In [facility] - PCP - Health Maintenance     Other Visit Diagnoses         Codes    Vitamin D deficiency     E55.9    Relevant Orders    Vitamin D 25-Hydroxy,Total (for eval of Vitamin D levels)    Prostate cancer screening     Z12.5    Relevant Orders    Prostate Specific Antigen, Screen               1. Mixed hyperlipidemia  CBC and Auto Differential    Comprehensive metabolic panel    Lipid panel    Tsh With Reflex To Free T4 If Abnormal    Hemoglobin A1c      2. Nocturia associated with benign prostatic hyperplasia  tadalafil (Cialis) 5 mg tablet      3. Anxiety  Follow Up In [facility] - PCP - Established    Follow Up In [facility] - PCP - Health Maintenance    Continue on current doses of low-dose Xanax.  .  Try to be outside every day.  Continue with daily meditation.  Call if worsens      4. Vitamin D deficiency  Vitamin D 25-Hydroxy,Total (for eval of Vitamin D levels)      5. Prostate cancer screening  Prostate Specific Antigen, Screen      6. Testosterone deficiency  Testosterone, total and free     Follow Up In [facility] - PCP - Health Maintenance      7. Primary hypertension          #1 anxiety    Currently using low-dose alprazolam twice a day to control anxiety.  He report no side effects on current medications.  Up-to-date with urine drug screen along with controlled substance agreement.  Continue using low-dose Xanax twice a day.  Continue to work on reducing dosing if possible.  If you would like to meet with a psychologist or a therapist please let me know    2.  Hypertension.    Continue on diet and exercise continued on medications to help lower your blood pressure.  Continue to monitor blood pressure at home.  Goal for systolic top number consistently less than 130 bottom number consistently less than 80    3.  Elevated cholesterol    Please have fasting labs at your convenience we will check kidney function liver function blood sugar cholesterol contact you with those results    4.  History of BPH with nighttime urination.  Continue on nightly use of Cialis    5.  History of low testosterone.  We will add a testosterone level to your next set of labs    6.  If you otherwise stay healthy I should see you back in 3 months happy to see you sooner if needed      Follow-up in 6 months or sooner if there are any concerns.    Scribe Attestation  By signing my name below, IChristina Scribe   attest that this documentation has been prepared under the direction and in the presence of Massimo Cowan MD.

## 2024-02-08 ENCOUNTER — TELEPHONE (OUTPATIENT)
Dept: PRIMARY CARE | Facility: CLINIC | Age: 64
End: 2024-02-08
Payer: COMMERCIAL

## 2024-02-08 NOTE — TELEPHONE ENCOUNTER
Please mail the orders for his labs to his address in [place],  Please schedule an In person office visit in the next 3 months

## 2024-03-07 DIAGNOSIS — F41.8 OTHER SPECIFIED ANXIETY DISORDERS: ICD-10-CM

## 2024-03-07 RX ORDER — ALPRAZOLAM 0.5 MG/1
0.5 TABLET ORAL 2 TIMES DAILY PRN
Qty: 60 TABLET | Refills: 0 | Status: SHIPPED | OUTPATIENT
Start: 2024-03-07 | End: 2024-04-08

## 2024-04-08 DIAGNOSIS — F41.8 OTHER SPECIFIED ANXIETY DISORDERS: ICD-10-CM

## 2024-04-08 RX ORDER — ALPRAZOLAM 0.5 MG/1
0.5 TABLET ORAL 2 TIMES DAILY PRN
Qty: 60 TABLET | Refills: 0 | Status: SHIPPED | OUTPATIENT
Start: 2024-04-08 | End: 2024-05-22

## 2024-04-17 DIAGNOSIS — E78.5 HYPERLIPIDEMIA, UNSPECIFIED HYPERLIPIDEMIA TYPE: ICD-10-CM

## 2024-04-17 RX ORDER — LISINOPRIL 40 MG/1
40 TABLET ORAL DAILY
Qty: 90 TABLET | Refills: 0 | Status: SHIPPED | OUTPATIENT
Start: 2024-04-17

## 2024-05-21 DIAGNOSIS — F41.8 OTHER SPECIFIED ANXIETY DISORDERS: ICD-10-CM

## 2024-05-22 RX ORDER — ALPRAZOLAM 0.5 MG/1
0.5 TABLET ORAL 2 TIMES DAILY PRN
Qty: 60 TABLET | Refills: 0 | Status: SHIPPED | OUTPATIENT
Start: 2024-05-22

## 2024-06-23 DIAGNOSIS — F41.8 OTHER SPECIFIED ANXIETY DISORDERS: ICD-10-CM

## 2024-06-24 RX ORDER — ALPRAZOLAM 0.5 MG/1
0.5 TABLET ORAL 2 TIMES DAILY PRN
Qty: 60 TABLET | Refills: 0 | Status: SHIPPED | OUTPATIENT
Start: 2024-06-24

## 2024-07-09 ENCOUNTER — APPOINTMENT (OUTPATIENT)
Dept: PRIMARY CARE | Facility: CLINIC | Age: 64
End: 2024-07-09
Payer: COMMERCIAL

## 2024-07-09 VITALS
OXYGEN SATURATION: 97 % | BODY MASS INDEX: 27.3 KG/M2 | TEMPERATURE: 97.3 F | HEART RATE: 70 BPM | HEIGHT: 69 IN | WEIGHT: 184.3 LBS | DIASTOLIC BLOOD PRESSURE: 97 MMHG | SYSTOLIC BLOOD PRESSURE: 173 MMHG

## 2024-07-09 DIAGNOSIS — F41.9 ANXIETY: ICD-10-CM

## 2024-07-09 DIAGNOSIS — E55.9 VITAMIN D DEFICIENCY: ICD-10-CM

## 2024-07-09 DIAGNOSIS — I10 PRIMARY HYPERTENSION: ICD-10-CM

## 2024-07-09 DIAGNOSIS — M51.16 RADICULOPATHY DUE TO LUMBAR INTERVERTEBRAL DISC DISORDER: ICD-10-CM

## 2024-07-09 DIAGNOSIS — Z00.00 ENCOUNTER FOR WELLNESS EXAMINATION IN ADULT: Primary | ICD-10-CM

## 2024-07-09 DIAGNOSIS — Z12.5 PROSTATE CANCER SCREENING: ICD-10-CM

## 2024-07-09 DIAGNOSIS — E78.2 MIXED HYPERLIPIDEMIA: ICD-10-CM

## 2024-07-09 DIAGNOSIS — Z79.899 MEDICATION MANAGEMENT: ICD-10-CM

## 2024-07-09 PROCEDURE — 3080F DIAST BP >= 90 MM HG: CPT | Performed by: FAMILY MEDICINE

## 2024-07-09 PROCEDURE — 80368 SEDATIVE HYPNOTICS: CPT

## 2024-07-09 PROCEDURE — 80373 DRUG SCREENING TRAMADOL: CPT

## 2024-07-09 PROCEDURE — 80365 DRUG SCREENING OXYCODONE: CPT

## 2024-07-09 PROCEDURE — 80354 DRUG SCREENING FENTANYL: CPT

## 2024-07-09 PROCEDURE — 99396 PREV VISIT EST AGE 40-64: CPT | Performed by: FAMILY MEDICINE

## 2024-07-09 PROCEDURE — 80361 OPIATES 1 OR MORE: CPT

## 2024-07-09 PROCEDURE — 3077F SYST BP >= 140 MM HG: CPT | Performed by: FAMILY MEDICINE

## 2024-07-09 PROCEDURE — 82570 ASSAY OF URINE CREATININE: CPT

## 2024-07-09 PROCEDURE — 80358 DRUG SCREENING METHADONE: CPT

## 2024-07-09 PROCEDURE — 99214 OFFICE O/P EST MOD 30 MIN: CPT | Performed by: FAMILY MEDICINE

## 2024-07-09 PROCEDURE — 80346 BENZODIAZEPINES1-12: CPT

## 2024-07-09 PROCEDURE — 80307 DRUG TEST PRSMV CHEM ANLYZR: CPT

## 2024-07-09 RX ORDER — CARVEDILOL PHOSPHATE 40 MG/1
40 CAPSULE, EXTENDED RELEASE ORAL DAILY
Qty: 30 CAPSULE | Refills: 11 | Status: SHIPPED | OUTPATIENT
Start: 2024-07-09 | End: 2024-07-12

## 2024-07-09 RX ORDER — CANDESARTAN 32 MG/1
32 TABLET ORAL DAILY
Qty: 30 TABLET | Refills: 11 | Status: SHIPPED | OUTPATIENT
Start: 2024-07-09 | End: 2024-07-09 | Stop reason: SDUPTHER

## 2024-07-09 RX ORDER — CANDESARTAN 32 MG/1
32 TABLET ORAL DAILY
Qty: 30 TABLET | Refills: 11 | Status: SHIPPED | OUTPATIENT
Start: 2024-07-09 | End: 2025-07-09

## 2024-07-09 RX ORDER — PREDNISONE 10 MG/1
TABLET ORAL DAILY
Qty: 21 TABLET | Refills: 0 | Status: SHIPPED | OUTPATIENT
Start: 2024-07-09 | End: 2024-07-14

## 2024-07-09 ASSESSMENT — ENCOUNTER SYMPTOMS
CHEST TIGHTNESS: 0
DIARRHEA: 0
NAUSEA: 0
PSYCHIATRIC NEGATIVE: 1
HEMATOLOGIC/LYMPHATIC NEGATIVE: 1
RESPIRATORY NEGATIVE: 1
GASTROINTESTINAL NEGATIVE: 1
EYES NEGATIVE: 1
ENDOCRINE NEGATIVE: 1
VOMITING: 0
SHORTNESS OF BREATH: 0
NEUROLOGICAL NEGATIVE: 1
MUSCULOSKELETAL NEGATIVE: 1
WOUND: 1
CONSTITUTIONAL NEGATIVE: 1
CARDIOVASCULAR NEGATIVE: 1
CONSTIPATION: 0
ALLERGIC/IMMUNOLOGIC NEGATIVE: 1

## 2024-07-09 ASSESSMENT — PATIENT HEALTH QUESTIONNAIRE - PHQ9
SUM OF ALL RESPONSES TO PHQ9 QUESTIONS 1 AND 2: 0
1. LITTLE INTEREST OR PLEASURE IN DOING THINGS: NOT AT ALL
2. FEELING DOWN, DEPRESSED OR HOPELESS: NOT AT ALL

## 2024-07-09 ASSESSMENT — ANXIETY QUESTIONNAIRES
7. FEELING AFRAID AS IF SOMETHING AWFUL MIGHT HAPPEN: NOT AT ALL
3. WORRYING TOO MUCH ABOUT DIFFERENT THINGS: NOT AT ALL
GAD7 TOTAL SCORE: 0
1. FEELING NERVOUS, ANXIOUS, OR ON EDGE: NOT AT ALL
2. NOT BEING ABLE TO STOP OR CONTROL WORRYING: NOT AT ALL
IF YOU CHECKED OFF ANY PROBLEMS ON THIS QUESTIONNAIRE, HOW DIFFICULT HAVE THESE PROBLEMS MADE IT FOR YOU TO DO YOUR WORK, TAKE CARE OF THINGS AT HOME, OR GET ALONG WITH OTHER PEOPLE: NOT DIFFICULT AT ALL
5. BEING SO RESTLESS THAT IT IS HARD TO SIT STILL: NOT AT ALL
4. TROUBLE RELAXING: NOT AT ALL
6. BECOMING EASILY ANNOYED OR IRRITABLE: NOT AT ALL

## 2024-07-09 ASSESSMENT — PROMIS GLOBAL HEALTH SCALE
RATE_PHYSICAL_HEALTH: GOOD
EMOTIONAL_PROBLEMS: RARELY
RATE_AVERAGE_PAIN: 1
CARRYOUT_SOCIAL_ACTIVITIES: EXCELLENT
RATE_MENTAL_HEALTH: EXCELLENT
RATE_QUALITY_OF_LIFE: VERY GOOD
CARRYOUT_PHYSICAL_ACTIVITIES: COMPLETELY
RATE_GENERAL_HEALTH: GOOD
RATE_SOCIAL_SATISFACTION: GOOD

## 2024-07-09 NOTE — PATIENT INSTRUCTIONS
1.  Well visit    Today in the office you had your annual wellness exam    With your next set of labs we will check a PSA your previous PSA was normal    Your previous colonoscopy 2020 repeat 5 years 2025    Please have fasting labs at your convenience we will check kidney function liver function blood sugar cholesterol recent labs indicated elevated liver enzymes which most likely related to recent infection.    Blood pressure is elevated today I am recommending that you continue on the Coreg 40 mg a day and add candesartan 32 mg a day.  Please discontinue the lisinopril.  If possible check blood pressure once in the morning once in the evening for 2 weeks send me those results.  Goal for top number consistently less than 140 bottom number consistently less than 80    By lowering blood pressure we are reducing risks for heart attack and stroke  With your history of herniated disc and radicular pain please use prednisone as needed prescription sent to the pharmacy    Continue on daily Cialis 5 mg to help with nighttime urination    Continue on valacyclovir    Continue on low-dose Xanax 0.5 mg twice a day as needed for anxiety.  You are up-to-date with your controlled substance agreement.  Because this medication is a controlled substance we we will repeat the annual urine test today    If your blood pressure returns normal and you remain healthy I can see you back in 3 months virtual visit or if you choose to get a new primary care provider I certainly understand that we can send copies of labs.    Please have labs done in 1 to 2 weeks after you have been on the new medication candesartan to verify that it is not causing any changes in your labs.    Consider getting a Shingles vaccine,  at the local pharmacy

## 2024-07-09 NOTE — PROGRESS NOTES
Subjective   Patient ID: Kashif Chapin is a 64 y.o. male who presents for Annual Exam (CPE).    HPI     The patient was handling biosolids and was taking off a boot when it hit his leg. Over a couple of days the patient developed cellulitis and  went to the ER to get treatment. The patient mentions dealing with healing and high blood pressure due to the injury itself. The patient blood pressure has been elevated since. The patient does take carvedilol and lisinopril to control blood pressure.    [place]AR[place]:  Massimo Cowan MD on 7/10/2024  2:53 PM  I have personally reviewed the OAR[place] report for Kashif Chapin. I have considered the risks of abuse, dependence, addiction and diversion and I believe that it is clinically appropriate for Kashif Chapin to be prescribed this medication    Is the patient prescribed a combination of a benzodiazepine and opioid?  No    Last Urine Drug Screen / ordered today: Yes  Recent Results (from the past 8760 hour(s))   Screen Opiate/Opioid/Benzo Prescription Compliance    Collection Time: 07/09/24  2:33 PM   Result Value Ref Range    Creatinine, Urine Random 29.1 20.0 - 370.0 mg/dL    Amphetamine Screen, Urine Presumptive Negative Presumptive Negative    Barbiturate Screen, Urine Presumptive Negative Presumptive Negative    Cannabinoid Screen, Urine Presumptive Negative Presumptive Negative    Cocaine Metabolite Screen, Urine Presumptive Negative Presumptive Negative    PCP Screen, Urine Presumptive Negative Presumptive Negative     Results are as expected.     Controlled Substance Agreement:  Date of the Last Agreement: 06/19/2023  Reviewed Controlled Substance Agreement including but not limited to the benefits, risks, and alternatives to treatment with a Controlled Substance medication(s).    Benzodiazepines:  What is the patient's goal of therapy? Xanax is used to help manage anxiety and anxiety symptoms to improve quality of life.   Is this being achieved with current treatment?  "yes    ELENA-7:  Over the last 2 weeks, how often have you been bothered by any of the following problems?  Feeling nervous, anxious, or on edge: 0  Not being able to stop or control worryin  Worrying too much about different things: 0  Trouble relaxin  Being so restless that it is hard to sit still: 0  Becoming easily annoyed or irritable: 0  Feeling afraid as if something awful might happen: 0  ELENA-7 Total Score: 0        Activities of Daily Living:   Is your overall impression that this patient is benefiting (symptom reduction outweighs side effects) from benzodiazepine therapy? Yes     1. Physical Functioning: Better  2. Family Relationship: Better  3. Social Relationship: Better  4. Mood: Better  5. Sleep Patterns: Better  6. Overall Function: Better    Review of Systems   Constitutional: Negative.    HENT: Negative.  Negative for dental problem and hearing loss.    Eyes: Negative.  Negative for visual disturbance.   Respiratory: Negative.  Negative for chest tightness and shortness of breath.    Cardiovascular: Negative.  Negative for chest pain.   Gastrointestinal: Negative.  Negative for constipation, diarrhea, nausea and vomiting.   Endocrine: Negative.    Genitourinary: Negative.    Musculoskeletal: Negative.    Skin:  Positive for wound.   Allergic/Immunologic: Negative.    Neurological: Negative.    Hematological: Negative.    Psychiatric/Behavioral: Negative.         Objective   BP (!) 173/97 (BP Location: Right arm, Patient Position: Sitting, BP Cuff Size: Large adult)   Pulse 70   Temp 36.3 °C (97.3 °F) (Temporal)   Ht 1.753 m (5' 9\")   Wt 83.6 kg (184 lb 4.8 oz)   SpO2 97%   BMI 27.22 kg/m²     Physical Exam  Constitutional:       Appearance: Normal appearance.   HENT:      Head: Normocephalic and atraumatic.      Nose: Nose normal.   Eyes:      Extraocular Movements: Extraocular movements intact.      Conjunctiva/sclera: Conjunctivae normal.      Pupils: Pupils are equal, round, and " reactive to light.   Cardiovascular:      Rate and Rhythm: Normal rate and regular rhythm.      Pulses: Normal pulses.      Heart sounds: Normal heart sounds.   Pulmonary:      Effort: Pulmonary effort is normal.      Breath sounds: Normal breath sounds and air entry.   Abdominal:      General: Bowel sounds are normal.      Palpations: Abdomen is soft.   Musculoskeletal:         General: Normal range of motion.      Cervical back: Normal range of motion.   Neurological:      Mental Status: He is alert.   Psychiatric:         Mood and Affect: Mood normal.         Behavior: Behavior normal.         Thought Content: Thought content normal.         Judgment: Judgment normal.         Assessment/Plan   Problem List Items Addressed This Visit             ICD-10-CM    Anxiety F41.9    Relevant Orders    Follow Up In [facility] - PCP - Established    Primary hypertension I10    Relevant Medications    carvedilol CR (Coreg CR) 40 mg 24 hr capsule    Hyperlipidemia E78.5    Relevant Orders    Hemoglobin A1c    Tsh With Reflex To Free T4 If Abnormal    Lipid panel    Comprehensive metabolic panel    CBC and Auto Differential    Radiculopathy due to lumbar intervertebral disc disorder M51.16    Relevant Medications    predniSONE (Deltasone) 10 mg tablet    Medication management - Primary Z79.899    Relevant Orders    Opiate/Opioid/Benzo Prescription Compliance    OOB Internal Tracking (Completed)     Other Visit Diagnoses         Codes    Vitamin D deficiency     E55.9    Relevant Orders    Vitamin D 25-Hydroxy,Total (for eval of Vitamin D levels)               1. Encounter for wellness examination in adult        2. Medication management  Opiate/Opioid/Benzo Prescription Compliance    OOB Internal Tracking      3. Primary hypertension  carvedilol CR (Coreg CR) 40 mg 24 hr capsule    DISCONTINUED: candesartan (Atacand) 32 mg tablet      4. Mixed hyperlipidemia  Hemoglobin A1c    Tsh With Reflex To Free T4 If Abnormal     Lipid panel    Comprehensive metabolic panel    CBC and Auto Differential      5. Radiculopathy due to lumbar intervertebral disc disorder  predniSONE (Deltasone) 10 mg tablet      6. Anxiety  Follow Up In [facility] - PCP - Established      7. Prostate cancer screening  Prostate Specific Antigen, Screen      8. Vitamin D deficiency  Vitamin D 25-Hydroxy,Total (for eval of Vitamin D levels)        1.  Well visit    Today in the office you had your annual wellness exam    With your next set of labs we will check a PSA your previous PSA was normal    Your previous colonoscopy 2020 repeat 5 years 2025    Please have fasting labs at your convenience we will check kidney function liver function blood sugar cholesterol recent labs indicated elevated liver enzymes which most likely related to recent infection.    Blood pressure is elevated today I am recommending that you continue on the Coreg 40 mg a day and add candesartan 32 mg a day.  Please discontinue the lisinopril.  If possible check blood pressure once in the morning once in the evening for 2 weeks send me those results.  Goal for top number consistently less than 140 bottom number consistently less than 80    By lowering blood pressure we are reducing risks for heart attack and stroke  With your history of herniated disc and radicular pain please use prednisone as needed prescription sent to the pharmacy    Continue on daily Cialis 5 mg to help with nighttime urination    Continue on valacyclovir    Continue on low-dose Xanax 0.5 mg twice a day as needed for anxiety.  You are up-to-date with your controlled substance agreement.  Because this medication is a controlled substance we we will repeat the annual urine test today    If your blood pressure returns normal and you remain healthy I can see you back in 3 months virtual visit or if you choose to get a new primary care provider I certainly understand that we can send copies of labs.    Please  have labs done in 1 to 2 weeks after you have been on the new medication candesartan to verify that it is not causing any changes in your labs.    Consider getting a Shingles vaccine,  at the local pharmacy          Follow-up in 6 months or sooner if there are any concerns.    Scribe Attestation  By signing my name below, Christina CORREA, Scribe   attest that this documentation has been prepared under the direction and in the presence of Massimo Cowan MD.    This note has been transcribed using a medical scribe and there is a possibility of unintentional typing misprints.

## 2024-07-10 PROBLEM — Z00.00 ENCOUNTER FOR WELLNESS EXAMINATION IN ADULT: Status: ACTIVE | Noted: 2024-07-10

## 2024-07-10 PROBLEM — M51.16 RADICULOPATHY DUE TO LUMBAR INTERVERTEBRAL DISC DISORDER: Status: ACTIVE | Noted: 2023-06-16

## 2024-07-10 PROBLEM — Z79.899 MEDICATION MANAGEMENT: Status: ACTIVE | Noted: 2024-07-10

## 2024-07-10 PROBLEM — Z12.5 PROSTATE CANCER SCREENING: Status: ACTIVE | Noted: 2024-07-10

## 2024-07-10 LAB
AMPHETAMINES UR QL SCN: NORMAL
BARBITURATES UR QL SCN: NORMAL
BZE UR QL SCN: NORMAL
CANNABINOIDS UR QL SCN: NORMAL
CREAT UR-MCNC: 29.1 MG/DL (ref 20–370)
PCP UR QL SCN: NORMAL

## 2024-07-12 DIAGNOSIS — I10 PRIMARY HYPERTENSION: ICD-10-CM

## 2024-07-12 RX ORDER — CARVEDILOL PHOSPHATE 40 MG/1
40 CAPSULE, EXTENDED RELEASE ORAL DAILY
Qty: 90 CAPSULE | Refills: 0 | Status: SHIPPED | OUTPATIENT
Start: 2024-07-12

## 2024-07-18 ENCOUNTER — APPOINTMENT (OUTPATIENT)
Dept: PRIMARY CARE | Facility: CLINIC | Age: 64
End: 2024-07-18
Payer: COMMERCIAL

## 2024-07-22 ENCOUNTER — TELEPHONE (OUTPATIENT)
Dept: PRIMARY CARE | Facility: CLINIC | Age: 64
End: 2024-07-22

## 2024-07-22 DIAGNOSIS — I10 PRIMARY HYPERTENSION: Primary | ICD-10-CM

## 2024-07-22 NOTE — TELEPHONE ENCOUNTER
Pt calling needs a prior auth on him Carvedilol ER 40 mg, Capsule?   He did mention the Carvedilol table is covered. Please advise. Pt is ok with either one.

## 2024-07-23 RX ORDER — CARVEDILOL 25 MG/1
25 TABLET ORAL 2 TIMES DAILY
Qty: 60 TABLET | Refills: 11 | Status: SHIPPED | OUTPATIENT
Start: 2024-07-23 | End: 2025-07-23

## 2024-07-23 NOTE — TELEPHONE ENCOUNTER
Ok to change to the tablet   I  will send a new script  please tell the patient if he switches to the tablet then he must take one tablet twice a day   it is a 25 mg tablet twice a day

## 2024-07-28 DIAGNOSIS — F41.8 OTHER SPECIFIED ANXIETY DISORDERS: ICD-10-CM

## 2024-07-31 RX ORDER — ALPRAZOLAM 0.5 MG/1
0.5 TABLET ORAL 2 TIMES DAILY PRN
Qty: 60 TABLET | Refills: 0 | Status: SHIPPED | OUTPATIENT
Start: 2024-07-31

## 2024-08-30 DIAGNOSIS — F41.8 OTHER SPECIFIED ANXIETY DISORDERS: ICD-10-CM

## 2024-09-04 RX ORDER — ALPRAZOLAM 0.5 MG/1
0.5 TABLET ORAL 2 TIMES DAILY PRN
Qty: 60 TABLET | Refills: 0 | Status: SHIPPED | OUTPATIENT
Start: 2024-09-04

## 2024-09-23 DIAGNOSIS — R35.1 NOCTURIA ASSOCIATED WITH BENIGN PROSTATIC HYPERPLASIA: ICD-10-CM

## 2024-09-23 DIAGNOSIS — N40.1 NOCTURIA ASSOCIATED WITH BENIGN PROSTATIC HYPERPLASIA: ICD-10-CM

## 2024-09-23 RX ORDER — TADALAFIL 5 MG/1
5 TABLET ORAL DAILY
Qty: 90 TABLET | Refills: 3 | Status: SHIPPED | OUTPATIENT
Start: 2024-09-23 | End: 2025-09-23

## 2024-10-10 DIAGNOSIS — F41.8 OTHER SPECIFIED ANXIETY DISORDERS: ICD-10-CM

## 2024-10-10 RX ORDER — ALPRAZOLAM 0.5 MG/1
0.5 TABLET ORAL 2 TIMES DAILY PRN
Qty: 60 TABLET | Refills: 0 | Status: SHIPPED | OUTPATIENT
Start: 2024-10-10

## 2024-10-18 ENCOUNTER — APPOINTMENT (OUTPATIENT)
Dept: PRIMARY CARE | Facility: CLINIC | Age: 64
End: 2024-10-18
Payer: COMMERCIAL

## 2024-11-11 DIAGNOSIS — F41.8 OTHER SPECIFIED ANXIETY DISORDERS: ICD-10-CM

## 2024-11-29 DIAGNOSIS — B00.9 HERPES SIMPLEX: ICD-10-CM

## 2024-12-04 RX ORDER — VALACYCLOVIR HYDROCHLORIDE 1 G/1
1000 TABLET, FILM COATED ORAL DAILY
Qty: 90 TABLET | Refills: 3 | Status: SHIPPED | OUTPATIENT
Start: 2024-12-04

## 2025-01-06 ENCOUNTER — APPOINTMENT (OUTPATIENT)
Dept: PRIMARY CARE | Facility: CLINIC | Age: 65
End: 2025-01-06
Payer: COMMERCIAL

## 2025-08-03 DIAGNOSIS — I10 PRIMARY HYPERTENSION: ICD-10-CM

## 2025-08-04 RX ORDER — CARVEDILOL 25 MG/1
25 TABLET ORAL 2 TIMES DAILY
Qty: 180 TABLET | Refills: 3 | OUTPATIENT
Start: 2025-08-04